# Patient Record
(demographics unavailable — no encounter records)

---

## 2024-10-22 NOTE — PHYSICAL EXAM
[No Acute Distress] : no acute distress [Well-Appearing] : well-appearing [Supple] : supple [No Respiratory Distress] : no respiratory distress  [No Accessory Muscle Use] : no accessory muscle use [Clear to Auscultation] : lungs were clear to auscultation bilaterally [Normal Rate] : normal rate  [Regular Rhythm] : with a regular rhythm [Normal S1, S2] : normal S1 and S2 [No Edema] : there was no peripheral edema [No Extremity Clubbing/Cyanosis] : no extremity clubbing/cyanosis [Soft] : abdomen soft [Non-distended] : non-distended [Grossly Normal Strength/Tone] : grossly normal strength/tone [Normal Gait] : normal gait [Normal Affect] : the affect was normal [Alert and Oriented x3] : oriented to person, place, and time [de-identified] : mild goiter?

## 2024-10-22 NOTE — PLAN
[FreeTextEntry1] : Hyperlipidemia cont' low fat/chol diet, consider statin therapy in near future HTN ; con't current med, norvasc 5mg daily, low salt diet flu shot given today con't calcium 1200-1500mg and vit D 1737-5504 IU daily and regular weight bearing exercise for OP, last DEXA 2023 Osteopenia range cont' Ibandronate 150mg monthly Neutropenia stable, asymptomatic, cont' monitor by periodic CBC RTC in 6 month MIN

## 2025-04-29 NOTE — PLAN
[FreeTextEntry1] :  HTN under control cont' amlodipine 5mg daily HLD 10 year CVD risk discussed with pt in detail, start lipitor 10mg daily  A/B/R explained to pt in detail. 3 month follow up fasting Osteopenia H/O Op, repeat DEXA. con't calcium 1200-1500mg and vit D 8417-1548 IU daily and regular weight bearing exercise, [id number] monthly Tension type HA, apply topical NSAID use as needed, check BP when HA recurs, observe.  observe closely clinically and RTC if persistent or worsening S/S

## 2025-04-29 NOTE — PLAN
[FreeTextEntry1] :  HTN under control cont' amlodipine 5mg daily HLD 10 year CVD risk discussed with pt in detail, start lipitor 10mg daily  A/B/R explained to pt in detail. 3 month follow up fasting Osteopenia H/O Op, repeat DEXA. con't calcium 1200-1500mg and vit D 9708-6093 IU daily and regular weight bearing exercise, [id number] monthly Tension type HA, apply topical NSAID use as needed, check BP when HA recurs, observe.  observe closely clinically and RTC if persistent or worsening S/S

## 2025-04-29 NOTE — HISTORY OF PRESENT ILLNESS
[FreeTextEntry1] : follow up for HTN HLD OP [de-identified] : pt has been doing well without any acute illness or injury, but recently has had occasional left posterior upper neck/head pain, mild to moderate, without any associated symptoms. It would go away with sleep. No N/V/vision change or other associated symptoms.

## 2025-04-29 NOTE — HEALTH RISK ASSESSMENT
[Very Good] : ~his/her~  mood as very good [No] : In the past 12 months have you used drugs other than those required for medical reasons? No [No falls in past year] : Patient reported no falls in the past year [0] : 2) Feeling down, depressed, or hopeless: Not at all (0) [PHQ-2 Negative - No further assessment needed] : PHQ-2 Negative - No further assessment needed [Yes] : Reviewed medication list for presence of high-risk medications. [Never] : Never [HIV test declined] : HIV test declined [Hepatitis C test declined] : Hepatitis C test declined [None] : None [With Family] : lives with family [Retired] : retired [] :  [# Of Children ___] : has [unfilled] children [Feels Safe at Home] : Feels safe at home [Fully functional (bathing, dressing, toileting, transferring, walking, feeding)] : Fully functional (bathing, dressing, toileting, transferring, walking, feeding) [Fully functional (using the telephone, shopping, preparing meals, housekeeping, doing laundry, using] : Fully functional and needs no help or supervision to perform IADLs (using the telephone, shopping, preparing meals, housekeeping, doing laundry, using transportation, managing medications and managing finances) [Reports normal functional visual acuity (ie: able to read med bottle)] : Reports normal functional visual acuity [Smoke Detector] : smoke detector [Carbon Monoxide Detector] : carbon monoxide detector [Safety elements used in home] : safety elements used in home [Seat Belt] :  uses seat belt [Sunscreen] : uses sunscreen [Designated Healthcare Proxy] : Designated healthcare proxy [Name: ___] : Health Care Proxy's Name: [unfilled]  [Relationship: ___] : Relationship: [unfilled] [No Retinopathy] : No retinopathy [de-identified] : walking, golf [de-identified] : low fat [[id number]] : 0 [EyeExamDate] : 1-2 years ago [Change in mental status noted] : No change in mental status noted [Language] : denies difficulty with language [Behavior] : denies difficulty with behavior [Learning/Retaining New Information] : denies difficulty learning/retaining new information [Handling Complex Tasks] : denies difficulty handling complex tasks [Reasoning] : denies difficulty with reasoning [Spatial Ability and Orientation] : denies difficulty with spatial ability and orientation [High Risk Behavior] : no high risk behavior [Reports changes in hearing] : Reports no changes in hearing [Reports changes in vision] : Reports no changes in vision [Reports changes in dental health] : Reports no changes in dental health [Travel to Developing Areas] : does not  travel to developing areas [TB Exposure] : is not being exposed to tuberculosis [Caregiver Concerns] : does not have caregiver concerns [MammogramDate] : 5/2024 [PapSmearDate] : ?? [BoneDensityDate] : 3/2023 [BoneDensityComments] : osteopenia [ColonoscopyDate] : 7/2023 [ColonoscopyComments] : benign

## 2025-04-29 NOTE — HISTORY OF PRESENT ILLNESS
[FreeTextEntry1] : follow up for HTN HLD OP [de-identified] : pt has been doing well without any acute illness or injury, but recently has had occasional left posterior upper neck/head pain, mild to moderate, without any associated symptoms. It would go away with sleep. No N/V/vision change or other associated symptoms.

## 2025-04-29 NOTE — HEALTH RISK ASSESSMENT
[Very Good] : ~his/her~  mood as very good [No] : In the past 12 months have you used drugs other than those required for medical reasons? No [No falls in past year] : Patient reported no falls in the past year [0] : 2) Feeling down, depressed, or hopeless: Not at all (0) [PHQ-2 Negative - No further assessment needed] : PHQ-2 Negative - No further assessment needed [Yes] : Reviewed medication list for presence of high-risk medications. [Never] : Never [HIV test declined] : HIV test declined [Hepatitis C test declined] : Hepatitis C test declined [None] : None [With Family] : lives with family [Retired] : retired [] :  [# Of Children ___] : has [unfilled] children [Feels Safe at Home] : Feels safe at home [Fully functional (bathing, dressing, toileting, transferring, walking, feeding)] : Fully functional (bathing, dressing, toileting, transferring, walking, feeding) [Fully functional (using the telephone, shopping, preparing meals, housekeeping, doing laundry, using] : Fully functional and needs no help or supervision to perform IADLs (using the telephone, shopping, preparing meals, housekeeping, doing laundry, using transportation, managing medications and managing finances) [Reports normal functional visual acuity (ie: able to read med bottle)] : Reports normal functional visual acuity [Smoke Detector] : smoke detector [Carbon Monoxide Detector] : carbon monoxide detector [Safety elements used in home] : safety elements used in home [Seat Belt] :  uses seat belt [Sunscreen] : uses sunscreen [Designated Healthcare Proxy] : Designated healthcare proxy [Name: ___] : Health Care Proxy's Name: [unfilled]  [Relationship: ___] : Relationship: [unfilled] [No Retinopathy] : No retinopathy [de-identified] : walking, golf [de-identified] : low fat [[id number]] : 0 [EyeExamDate] : 1-2 years ago [Change in mental status noted] : No change in mental status noted [Language] : denies difficulty with language [Behavior] : denies difficulty with behavior [Learning/Retaining New Information] : denies difficulty learning/retaining new information [Handling Complex Tasks] : denies difficulty handling complex tasks [Reasoning] : denies difficulty with reasoning [Spatial Ability and Orientation] : denies difficulty with spatial ability and orientation [High Risk Behavior] : no high risk behavior [Reports changes in hearing] : Reports no changes in hearing [Reports changes in vision] : Reports no changes in vision [Reports changes in dental health] : Reports no changes in dental health [Travel to Developing Areas] : does not  travel to developing areas [TB Exposure] : is not being exposed to tuberculosis [Caregiver Concerns] : does not have caregiver concerns [MammogramDate] : 5/2024 [PapSmearDate] : ?? [BoneDensityDate] : 3/2023 [BoneDensityComments] : osteopenia [ColonoscopyDate] : 7/2023 [ColonoscopyComments] : benign

## 2025-04-29 NOTE — PHYSICAL EXAM
[Well Nourished] : well nourished [Well Developed] : well developed [Well-Appearing] : well-appearing [Normal Sclera/Conjunctiva] : normal sclera/conjunctiva [PERRL] : pupils equal round and reactive to light [EOMI] : extraocular movements intact [Normal Outer Ear/Nose] : the outer ears and nose were normal in appearance [No JVD] : no jugular venous distention [No Lymphadenopathy] : no lymphadenopathy [Thyroid Normal, No Nodules] : the thyroid was normal and there were no nodules present [Normal S1, S2] : normal S1 and S2 [No Murmur] : no murmur heard [No Carotid Bruits] : no carotid bruits [No Edema] : there was no peripheral edema [No Extremity Clubbing/Cyanosis] : no extremity clubbing/cyanosis [Soft] : abdomen soft [Non Tender] : non-tender [Non-distended] : non-distended [Normal Anterior Cervical Nodes] : no anterior cervical lymphadenopathy [No CVA Tenderness] : no CVA  tenderness [No Spinal Tenderness] : no spinal tenderness [Normal Affect] : the affect was normal [Normal Insight/Judgement] : insight and judgment were intact [Normal Appearance] : normal in appearance [No Masses] : no palpable masses [Alert and Oriented x3] : oriented to person, place, and time [No Acute Distress] : no acute distress [Supple] : supple [No Respiratory Distress] : no respiratory distress  [No Accessory Muscle Use] : no accessory muscle use [Clear to Auscultation] : lungs were clear to auscultation bilaterally [Normal Rate] : normal rate  [Regular Rhythm] : with a regular rhythm [Grossly Normal Strength/Tone] : grossly normal strength/tone [Coordination Grossly Intact] : coordination grossly intact [No Focal Deficits] : no focal deficits [Normal Gait] : normal gait

## 2025-04-29 NOTE — REVIEW OF SYSTEMS
[Headache] : headache [Negative] : Musculoskeletal [Dizziness] : no dizziness [Fainting] : no fainting [Unsteady Walking] : no ataxia